# Patient Record
Sex: FEMALE | Race: WHITE
[De-identification: names, ages, dates, MRNs, and addresses within clinical notes are randomized per-mention and may not be internally consistent; named-entity substitution may affect disease eponyms.]

---

## 2019-12-08 NOTE — ECGEPIP
[facility] - ED

                                       

                                       Test Date:    2019

Pat Name:     PREET LAN           Department:   

Patient ID:   [id number]                 Room:         -

Gender:       Female                   Technician:   

:          1980               Requested By: ROSA FRANCE 

Order Number: [id number]     Reading MD:   Nohemi Hair

                                 Measurements

Intervals                              Axis          

Rate:         68                       P:            47

DC:           137                      QRS:          8

QRSD:         74                       T:            19

QT:           396                                    

QTc:          423                                    

                           Interpretive Statements

SINUS RHYTHM WITH SINUS ARRHYTHMIA

NSTTW abnormalities

No prior

Electronically Signed on 2019 9:47:25 EST by Nohemi Hair

## 2019-12-08 NOTE — REP
AP PORTABLE CHEST:  12/08/2019.

 

Clinical history:  Chest pain.

 

Findings:  No prior study.  The lung fields are well inflated.  Some

emphysematous appearance to the mid and upper lung zones.  Some slight crowding

of markings in the lower lung zones but adequate inflation overall.  There is no

effusion or acute infiltrate nor any parenchymal mass visible.  The heart,

mediastinal and hilar contours are normal.  The aorta and airway are intact.

Bones unremarkable.

 

Impression:

 

1.  Findings suggest some mid and upper lung zone emphysematous change but no

infiltrate, effusion, cardiomegaly, edema or other significant finding.

 

 

Electronically Signed by

Santos Mcclure MD 12/08/2019 05:22 P

## 2020-02-04 ENCOUNTER — HOSPITAL ENCOUNTER (EMERGENCY)
Dept: HOSPITAL 53 - M ED | Age: 40
LOS: 1 days | Discharge: HOME | End: 2020-02-05
Payer: MEDICARE

## 2020-02-04 VITALS — HEIGHT: 60 IN | BODY MASS INDEX: 29.51 KG/M2 | WEIGHT: 150.31 LBS

## 2020-02-04 DIAGNOSIS — I25.2: ICD-10-CM

## 2020-02-04 DIAGNOSIS — F12.10: ICD-10-CM

## 2020-02-04 DIAGNOSIS — I25.10: ICD-10-CM

## 2020-02-04 DIAGNOSIS — F41.1: ICD-10-CM

## 2020-02-04 DIAGNOSIS — R06.02: ICD-10-CM

## 2020-02-04 DIAGNOSIS — Z88.0: ICD-10-CM

## 2020-02-04 DIAGNOSIS — Z79.899: ICD-10-CM

## 2020-02-04 DIAGNOSIS — G35: ICD-10-CM

## 2020-02-04 DIAGNOSIS — R07.9: Primary | ICD-10-CM

## 2020-02-04 DIAGNOSIS — Z88.8: ICD-10-CM

## 2020-02-04 DIAGNOSIS — Z88.2: ICD-10-CM

## 2020-02-04 LAB
BASOPHILS # BLD AUTO: 0.1 10^3/UL (ref 0–0.2)
BASOPHILS NFR BLD AUTO: 0.4 % (ref 0–1)
EOSINOPHIL # BLD AUTO: 0.4 10^3/UL (ref 0–0.5)
EOSINOPHIL NFR BLD AUTO: 3.9 % (ref 0–3)
HCT VFR BLD AUTO: 38.2 % (ref 36–47)
HGB BLD-MCNC: 12.6 G/DL (ref 12–15.5)
LYMPHOCYTES # BLD AUTO: 4 10^3/UL (ref 1.5–5)
LYMPHOCYTES NFR BLD AUTO: 36.3 % (ref 24–44)
MCH RBC QN AUTO: 27.8 PG (ref 27–33)
MCHC RBC AUTO-ENTMCNC: 33 G/DL (ref 32–36.5)
MCV RBC AUTO: 84.1 FL (ref 80–96)
MONOCYTES # BLD AUTO: 0.9 10^3/UL (ref 0–0.8)
MONOCYTES NFR BLD AUTO: 7.9 % (ref 0–5)
NEUTROPHILS # BLD AUTO: 5.7 10^3/UL (ref 1.5–8.5)
NEUTROPHILS NFR BLD AUTO: 51.3 % (ref 36–66)
PLATELET # BLD AUTO: 281 10^3/UL (ref 150–450)
RBC # BLD AUTO: 4.54 10^6/UL (ref 4–5.4)
WBC # BLD AUTO: 11.1 10^3/UL (ref 4–10)

## 2020-02-05 VITALS — SYSTOLIC BLOOD PRESSURE: 109 MMHG | DIASTOLIC BLOOD PRESSURE: 72 MMHG

## 2020-02-05 LAB
BUN SERPL-MCNC: 13 MG/DL (ref 7–18)
CALCIUM SERPL-MCNC: 9.2 MG/DL (ref 8.5–10.1)
CHLORIDE SERPL-SCNC: 105 MEQ/L (ref 98–107)
CK MB CFR.DF SERPL CALC: 2
CK MB CFR.DF SERPL CALC: 2.56
CK MB SERPL-MCNC: < 1 NG/ML (ref ?–3.6)
CK MB SERPL-MCNC: < 1 NG/ML (ref ?–3.6)
CK SERPL-CCNC: 39 U/L (ref 26–192)
CK SERPL-CCNC: 50 U/L (ref 26–192)
CO2 SERPL-SCNC: 28 MEQ/L (ref 21–32)
CREAT SERPL-MCNC: 0.74 MG/DL (ref 0.55–1.3)
GFR SERPL CREATININE-BSD FRML MDRD: > 60 ML/MIN/{1.73_M2} (ref 60–?)
GLUCOSE SERPL-MCNC: 88 MG/DL (ref 70–100)
POTASSIUM SERPL-SCNC: 4 MEQ/L (ref 3.5–5.1)
SODIUM SERPL-SCNC: 138 MEQ/L (ref 136–145)
TROPONIN I SERPL-MCNC: < 0.02 NG/ML (ref ?–0.1)
TROPONIN I SERPL-MCNC: < 0.02 NG/ML (ref ?–0.1)

## 2020-02-05 NOTE — REP
Clinical:  Chest pain .

 

Comparison:  12/08/2019 .

 

Findings:

The mediastinum and cardiac silhouette are stable and within normal limits for

portable technique.  The lung fields are clear without acute consolidation,

effusion, or pneumothorax.  Skeletal structures are intact.

 

Impression:

No acute cardiopulmonary process appreciated.

 

 

Electronically Signed by

David Thakur MD 02/05/2020 03:10 A

## 2020-02-05 NOTE — ECGEPIP
[facility] - ED

                                       

                                       Test Date:    2020

Pat Name:     PREET LAN           Department:   

Patient ID:   [id number]                 Room:         -

Gender:       Female                   Technician:   

:          1980               Requested By: PETER CHAPPELL

Order Number: [id number]     Reading MD:   Nohemi Hair

                                 Measurements

Intervals                              Axis          

Rate:         68                       P:            -4

LA:           139                      QRS:          14

QRSD:         76                       T:            7

QT:           397                                    

QTc:          424                                    

                           Interpretive Statements

SINUS RHYTHM

SIMILAR 20

Electronically Signed on 2020 15:01:03 EST by Nohemi Hair

## 2020-02-05 NOTE — ECGEPIP
[facility] - ED

                                       

                                       Test Date:    2020

Pat Name:     PREET LAN           Department:   

Patient ID:   [id number]                 Room:         -

Gender:       Female                   Technician:   

:          1980               Requested By: PETER CHAPPELL

Order Number: [id number]     Reading MD:   Nohemi Hair

                                 Measurements

Intervals                              Axis          

Rate:         67                       P:            -3

NV:           129                      QRS:          13

QRSD:         77                       T:            14

QT:           407                                    

QTc:          433                                    

                           Interpretive Statements

SINUS RHYTHM

SIMILAR 19

Electronically Signed on 2020 15:00:32 EST by Nohemi Hair